# Patient Record
Sex: MALE | Race: WHITE | NOT HISPANIC OR LATINO | Employment: OTHER | URBAN - NONMETROPOLITAN AREA
[De-identification: names, ages, dates, MRNs, and addresses within clinical notes are randomized per-mention and may not be internally consistent; named-entity substitution may affect disease eponyms.]

---

## 2018-05-14 ENCOUNTER — HISTORICAL (OUTPATIENT)
Dept: ADMINISTRATIVE | Facility: HOSPITAL | Age: 63
End: 2018-05-14

## 2018-05-28 ENCOUNTER — HISTORICAL (OUTPATIENT)
Dept: RADIOLOGY | Facility: HOSPITAL | Age: 63
End: 2018-05-28

## 2018-05-30 ENCOUNTER — HISTORICAL (OUTPATIENT)
Dept: RADIOLOGY | Facility: HOSPITAL | Age: 63
End: 2018-05-30

## 2019-07-30 LAB
ABS NEUT (OLG): 3.1 X10(3)/MCL (ref 1.5–6.9)
APPEARANCE, UA: CLEAR
APTT PPP: 28.6 SECOND(S) (ref 25–35)
BILIRUB UR QL STRIP: NEGATIVE
BUN SERPL-MCNC: 19 MG/DL (ref 10–20)
CALCIUM SERPL-MCNC: 9.1 MG/DL (ref 8–10.5)
CHLORIDE SERPL-SCNC: 104 MMOL/L (ref 100–108)
CO2 SERPL-SCNC: 32 MMOL/L (ref 21–35)
COLOR UR: NORMAL
CREAT SERPL-MCNC: 1.13 MG/DL (ref 0.7–1.3)
CREAT/UREA NIT SERPL: 17
CRP SERPL-MCNC: 0.4 MG/DL (ref 0–0.9)
ERYTHROCYTE [DISTWIDTH] IN BLOOD BY AUTOMATED COUNT: 13.3 % (ref 11.5–17)
ERYTHROCYTE [SEDIMENTATION RATE] IN BLOOD: 15 MM/HR (ref 0–15)
GLUCOSE (UA): NEGATIVE
GLUCOSE SERPL-MCNC: 114 MG/DL (ref 75–116)
HCT VFR BLD AUTO: 44.7 % (ref 42–52)
HGB BLD-MCNC: 14.6 GM/DL (ref 14–18)
HGB UR QL STRIP: NEGATIVE
INR PPP: 1 (ref 0–1.2)
KETONES UR QL STRIP: NEGATIVE
LEUKOCYTE ESTERASE UR QL STRIP: NEGATIVE
MCH RBC QN AUTO: 31 PG (ref 27–34)
MCHC RBC AUTO-ENTMCNC: 33 GM/DL (ref 31–36)
MCV RBC AUTO: 96 FL (ref 80–99)
NITRITE UR QL STRIP: NEGATIVE
PH UR STRIP: 6.5 [PH]
PLATELET # BLD AUTO: 233 X10(3)/MCL (ref 140–400)
PMV BLD AUTO: 10.3 FL (ref 6.8–10)
POTASSIUM SERPL-SCNC: 4.1 MMOL/L (ref 3.6–5.2)
PROT UR QL STRIP: NEGATIVE
PROTHROMBIN TIME: 10.2 SECOND(S) (ref 9–12)
RBC # BLD AUTO: 4.66 X10(6)/MCL (ref 4.7–6.1)
SODIUM SERPL-SCNC: 140 MMOL/L (ref 135–145)
SP GR UR STRIP: 1.02
UROBILINOGEN UR STRIP-ACNC: 0.2 EU/DL
WBC # SPEC AUTO: 5.6 X10(3)/MCL (ref 4.5–11.5)

## 2019-08-01 LAB — FINAL CULTURE: NORMAL

## 2019-08-14 ENCOUNTER — HISTORICAL (OUTPATIENT)
Dept: ANESTHESIOLOGY | Facility: HOSPITAL | Age: 64
End: 2019-08-14

## 2020-06-29 LAB
ABS NEUT (OLG): 4.7 X10(3)/MCL (ref 1.5–6.9)
APPEARANCE, UA: CLEAR
APTT PPP: 31.9 SECOND(S) (ref 25–35)
BACTERIA SPEC CULT: NORMAL /HPF
BILIRUB UR QL STRIP: NEGATIVE
BUN SERPL-MCNC: 17 MG/DL (ref 8.4–25.7)
CALCIUM SERPL-MCNC: 9.8 MG/DL (ref 8.8–10)
CHLORIDE SERPL-SCNC: 101 MMOL/L (ref 98–107)
CO2 SERPL-SCNC: 30 MMOL/L (ref 23–31)
COLOR UR: YELLOW
CREAT SERPL-MCNC: 1.01 MG/DL (ref 0.73–1.18)
CREAT/UREA NIT SERPL: 17
CRP SERPL-MCNC: 0.4 MG/DL
ERYTHROCYTE [DISTWIDTH] IN BLOOD BY AUTOMATED COUNT: 13.9 % (ref 11.5–17)
ERYTHROCYTE [SEDIMENTATION RATE] IN BLOOD: 22 MM/HR (ref 0–15)
GLUCOSE (UA): NEGATIVE MG/DL
GLUCOSE SERPL-MCNC: 98 MG/DL (ref 82–115)
GROUP & RH: NORMAL
HCT VFR BLD AUTO: 46.7 % (ref 42–52)
HGB BLD-MCNC: 15.2 GM/DL (ref 14–18)
HGB UR QL STRIP: NEGATIVE
INR PPP: 1 (ref 0–1.2)
KETONES UR QL STRIP: NEGATIVE MG/DL
LEUKOCYTE ESTERASE UR QL STRIP: NORMAL
MCH RBC QN AUTO: 31 PG (ref 27–34)
MCHC RBC AUTO-ENTMCNC: 32 GM/DL (ref 31–36)
MCV RBC AUTO: 95 FL (ref 80–99)
MRSA SCREEN BY PCR: NEGATIVE
NITRITE UR QL STRIP: NEGATIVE
PH UR STRIP: 7.5 [PH] (ref 4.6–8)
PLATELET # BLD AUTO: 255 X10(3)/MCL (ref 140–400)
PMV BLD AUTO: 10 FL (ref 6.8–10)
POTASSIUM SERPL-SCNC: 4.3 MMOL/L (ref 3.5–5.1)
PROT UR QL STRIP: NEGATIVE MG/DL
PROTHROMBIN TIME: 13.3 SECOND(S) (ref 9–12)
RBC # BLD AUTO: 4.92 X10(6)/MCL (ref 4.7–6.1)
RBC #/AREA URNS HPF: NORMAL /HPF
SODIUM SERPL-SCNC: 138 MMOL/L (ref 136–145)
SP GR UR STRIP: 1.02 (ref 1–1.03)
SQUAMOUS EPITHELIAL, UA: NORMAL /LPF
UROBILINOGEN UR STRIP-ACNC: 0.2 EU/DL
WBC # SPEC AUTO: 8.1 X10(3)/MCL (ref 4.5–11.5)
WBC #/AREA URNS HPF: NORMAL /HPF

## 2020-07-01 ENCOUNTER — HISTORICAL (OUTPATIENT)
Dept: MEDSURG UNIT | Facility: HOSPITAL | Age: 65
End: 2020-07-01

## 2020-07-02 LAB
HCT VFR BLD AUTO: 40.1 % (ref 42–52)
HGB BLD-MCNC: 13.5 GM/DL (ref 14–18)

## 2022-04-30 NOTE — OP NOTE
PREOP DIAGNOSIS:  Osteoarthritis, left knee.    POSTOP DIAGNOSIS:  Osteoarthritis, left knee.    PROCEDURE:  Left total knee arthroplasty.    IMPLANT TYPE:  Attune porous-coated knee, size 9 femur, size 10 tibia, size 5 polyethylene insert, non-resurfaced patella.    INDICATION:  The patient is a 64-year-old active male with degenerative joint disease.  Informed consent was obtained.  The risks of the procedure were explained, not excluding infection, bleeding, pain, scarring, neurovascular injury, DVT, pulmonary embolism, and even death.    PROCEDURE IN DETAIL:  Given IV antibiotics and spinal anesthesia.  Tourniquet was inflated.  Anterior incision was made.  Medial arthrotomy was performed.  Patella was everted.  It was preserved, not resurfaced.  Generous medial release was done.  Osteophytes removed from the periphery of the femur and the tibia.  Using IM instrumentation, a distal femoral cut was made.  Using IM instrumentation, a proximal tibial cut was made.  Measured the extension gap for 5 bilaterally.  Next, the femur was sized for a 9 using posterior reference system.  Flexion gap was also measured for rotational purposes.  Finishing guide was secured.  Anterior, posterior, and chamfer cuts followed by the chip shot.  Tibia __________ finishing prep was made full coverage with a size 10 conical reamer followed by drilling of 4 small pegs.  Bone quality was excellent.  Back of the knee was cleaned of meniscal remnants.  The patella was not resurfaced.  Impacted porous-coated components and a rotating platform polyethylene.  The knee was taken through a full range of motion, full extension, full flexion.  I did a little recess at the PCL, which allowed better tracking.  Wound was Pulsavac lavaged with a light Betadine irrigant, closed over a drain.  Interrupted #1 Vicryl subcu 2-0 with a subcuticular stitch.  No complications.      MRH/MODL   DD: 07/01/2020 1911   DT: 07/01/2020 2002  Job #  [id number]

## 2022-05-02 NOTE — HISTORICAL [facility] CERNER
This is a historical note converted from [place]. Formatting and pictures may have been removed.  Please reference [place] for original formatting and attached multimedia. Admit and Discharge Dates  Admit Date: 07/01/2020  Discharge Date: 07/02/2020  Physicians  Attending Physician - Rufus GORDON, Pedro Luis CLARK  Admitting Physician - Rufus GORDON, Pedro Luis CLARK  Consulting Physician - Rufus GORDON, Pedro Luis CLARK  Primary Care Physician - Gloria GORDON, Hanna NEELY  Discharge Diagnosis  Hypertension?I10  Osteoarthritis of knee?M17  Surgical Procedures  07/01/2020 - [id number] - Total Knee Arthroplasty  Immunizations  No immunizations recorded for this visit.  Significant Findings  No perioperative complcations  Objective  Vitals & Measurements  T:?36.4? ?C (Oral)? TMIN:?36.4? ?C (Oral)? TMAX:?36.6? ?C (Oral)? HR:?64(Peripheral)? BP:?107/60? SpO2:?95%?  Physical Exam  General:?well-developed well-nourished in no acute distress  Eye: EOMI, clear conjunctiva, eyelids normal  HENT:?Normocephalic/atraumatic, oropharynx and nasal mucosal surfaces moist  Neck: full range of motion, no lymphadenopathy  Respiratory:?Non-labored breathing  Cardiovascular:?regular rate and rhythm, no edema  Gastrointestinal:?soft, non-tender, non-distended, without masses to palpation  Genitourinary: no CVA tenderness to palpation  Musculoskeletal:??? Left lower extremity:? Aqucel dressing is clean dry and intact.? There is no excessive swelling or erythema noted about the incision.? NV intact to the distal extremity.? Moderate discomfort with ROM of the knee, to be expected following surgery.  Integumentary: no rashes or skin lesions present  Neurologic: cranial nerves intact, no signs of peripheral neurological deficit, motor/sensory function intact  ?  Patient Discharge Condition  Stable, denies chest pain, calf pain, shortness of breath and dyspnea Hemoglobin 13.5, Hematocrit 40.1  Discharge Disposition  Patient is discharged home with [facility] to  include PT, WBAT with walker  He is given a prescription for Percocet 10/325 mg q 6 hrs prn, Toradol 10 mg q 8 hrs prn, Duricef 500 mg BID x 10 d  He will continue  mg BID and wear bilateral PORSHA hose for 6 weeks for DVT prophylaxis  Follow up with Dr. Hooper in 2 weeks, Aquacel dressing to remain in place until then.   Discharge Medication Reconciliation  Prescribed  aspirin (Ecotrin 325 mg oral enteric coated tablet)?325 mg, Oral, BID  Continue  fexofenadine (Allegra 24 Hour Allergy oral tablet)?180 mg, Oral, qAM  lisinopril (LISINOPRIL 20 MG TABLET)?20 mg, Oral, qAM  tamsulosin (TAMSULOSIN 0.4MG CAPSULES)?0.8 mg, Oral, qAM  Education and Orders Provided  Total Knee Replacement, Easy-to-Read  Discharge - 07/02/20 16:30:00 CDT, Dis/Trn Home Health, [place] home with HH, PT WBAT with walker, percocet 10/325 q 6hr prn, toradol 10mg ?q 8 hrs prn, duricef 500mg BID x 10d, asa 325mg BID and marino porsha hose for 6 weeks for dvt prophylaxis follow up with dr. rocha...?  Follow up  Pedro Luis Hooper, within 2 weeks, on

## 2022-10-26 ENCOUNTER — HOSPITAL ENCOUNTER (EMERGENCY)
Facility: HOSPITAL | Age: 67
Discharge: HOME | End: 2022-10-26
Attending: INTERNAL MEDICINE
Payer: MEDICARE

## 2022-10-26 VITALS
WEIGHT: 300 LBS | SYSTOLIC BLOOD PRESSURE: 179 MMHG | DIASTOLIC BLOOD PRESSURE: 92 MMHG | HEIGHT: 69 IN | RESPIRATION RATE: 16 BRPM | TEMPERATURE: 98 F | BODY MASS INDEX: 44.43 KG/M2 | OXYGEN SATURATION: 95 % | HEART RATE: 65 BPM

## 2022-10-26 DIAGNOSIS — H43.392 FLOATERS IN VISUAL FIELD, LEFT: Primary | ICD-10-CM

## 2022-10-26 DIAGNOSIS — I10 HYPERTENSION, UNSPECIFIED TYPE: ICD-10-CM

## 2022-10-26 PROCEDURE — 99281 EMR DPT VST MAYX REQ PHY/QHP: CPT

## 2022-10-26 NOTE — ED PROVIDER NOTES
10/26/2022         11:11 AM    Source of History:  History obtained from the patient.       Chief complaint:  From Nurse Triage:  Eye Problem (L eye floaters starting yesterday   today feels like there is a spider web over his L eye and it is making him dizzy)    HPI:  Danny P Lejeune is a 67 y.o. male presenting with Eye Problem (L eye floaters starting yesterday   today feels like there is a spider web over his L eye and it is making him dizzy)       Patient comes to the emergency room with complaint of the floater in the left eye since yesterday, and it got worse this morning where he started having some blurred vision on the left eye and spider web in front of the eye and felt a little dizzy so he decided to come to the emergency room.  Denies any other complaints whatsoever.    Review of Systems   Constitutional symptoms:  No Fever. No Chills    Skin symptoms:  No Rash.    Eye symptoms:  Visual disturbance reported in left eye with the floater and the blurred vision and a web in front of the eye..    ENMT symptoms:  No Sore throat,    Respiratory symptoms:  No Shortness of Breath, no Cough, no Wheezing.    Cardiovascular symptoms:  No Chest Pain, No Palpitations, No Tachycardia.    Gastrointestinal symptoms:  No Abdominal Pain, No Nausea, No Vomiting, No Diarrhea, No Constipation.    Genitourinary symptoms:  No Dysuria,    Musculoskeletal symptoms:  No Back pain,    Neurologic symptoms:  No Headache, No Dizziness.    Psychiatric symptoms:  No Anxiety, No Depression, No Substance Abuse.              Additional review of systems information: Patient Denies Any Other Complaints.  All Other Systems Reviewed With Patient And Negative.    ALLEGIES:  Review of patient's allergies indicates:   Allergen Reactions    Hydromorphone Hives    Morphine Hives       HOME MEDICINES:  No current facility-administered medications for this encounter.  No current outpatient medications on file.    PMH:  As per HPI and  below:    Reviewed and updated in chart.    PAST MEDICAL HISTORY:  Past Medical History:   Diagnosis Date    Hypertension         PAST SURGICAL HISTORY:  Past Surgical History:   Procedure Laterality Date    BACK SURGERY      CERVICAL SPINE SURGERY      Prostate lift         SOCIAL HISTORY:       FAMILY HISTORY:  Family History   Problem Relation Age of Onset    Hypertension Mother     Heart disease Mother     Stroke Father     Hypertension Father         PROBLEM LIST:  There is no problem list on file for this patient.       PHYSICAL EXAM:      ED Triage Vitals [10/26/22 1045]   BP (!) 179/92   Pulse 65   Resp 16   Temp 97.7 °F (36.5 °C)   SpO2 95 %        Vital Signs: Reviewed As In Chart.  General:  Alert, No Cardiorespiratory Distress Noted.   Skin: Normal For Ethnic Origin  Eye:  Extraocular Movements Are Intact.  Lids normal bilaterally  Cornea clear bilaterally, conjunctiva clear bilaterally, sclera clear bilaterally, people are around regular and reacting to light and accommodation, anterior chamber is clear bilaterally, Lense is clear bilaterally.  I was not able to see the whole retina but part of the retina which I saw appears to be normal except for some silver streaking of the blood vessels.  ENT: Mucus membranes are moist.   Cardiovascular:  Regular Rate And Rhythm, No Murmur, No Pedal Edema.    Respiratory:  Respirations Nonlabored, No Respiratory Distress, Good Bilateral Air Entry, No Rales, No Rhonchi.    Musculoskeletal:  No Gross Deformity Noted.   Gastrointestinal:  Soft, Non Distended, Non Tender, Normal Bowel Sounds.    Neurological:  Alert And Oriented To Person, Place, Time, And Situation, Normal Motor Observed, Normal Speech Observed.  NIH 0, VAN Neg.  Psychiatric:  Cooperative, Appropriate Mood & Affect.    INITIAL IMPRESSION/ DIFFERENTIAL DX:      MEDICAL DECISION MAKING:      Reviewed Nurses Note. Reviewed Vital Signs.     Reviewed Pertinent old records, History and updated as  necessary.    67 y.o. male with Eye Problem (L eye floaters starting yesterday   today feels like there is a spider web over his L eye and it is making him dizzy)    Patient is not having a major blood vessel stroke at this time, but he does have the visual disturbance, I could see part of the retina, and I advised him that he has to be seen by Ophthalmology today.    ED WORKUP AND COURSE:  ED ORDERS:  No orders of the defined types were placed in this encounter.      Medications - No data to display             ED LABS ORDERED AND REVIEWED:      RADIOLOGY STUDIES ORDERED AND REVIEWED:  Imaging Results    None         ED COURSE AND REEVALUATIONS:  Vitals:    10/26/22 1045   BP: (!) 179/92   Pulse: 65   Resp: 16   Temp: 97.7 °F (36.5 °C)       PROCEDURES PERFORMED IN ED:  Procedures    ED Course as of 10/26/22 1115   Wed Oct 26, 2022   1108 Advised to pt. That he has to be seen by Ophthalmology today and we do not have coverage, I can try to transfer them or if they have Ophthalmologist they can see them. Pts. Wife called Dr. Lugo's office and they advised them to go to Dr. Gaines's office in [place] now and they can take care of every thing and they decided to go there. Will discharge and let them see Ophthalmology first and then if needed advised that they can always come back to our ER and one of the ER in [place] where they have neurology and ophthalmology both available. [GQ]      ED Course User Index  [GQ] Colin Camacho MD              DIAGNOSTIC IMPRESSION:      1. Floaters in visual field, left    2. Hypertension, unspecified type         ED Disposition Condition    Discharge Stable               Medication List      You have not been prescribed any medications.           Follow-up Information       Eye Doctor In 1 day.                              ED Prescriptions    None       Follow-up Information       Follow up With Specialties Details Why Contact Info    Eye Doctor  In 1 day                  Colin Camacho MD  10/26/22 3960

## 2022-10-26 NOTE — DISCHARGE INSTRUCTIONS
Take medicines as prescribed    See your family doctor in one to 2 days for further evaluation, workup, and treatment as necessary    Avoid driving or operating machinery while taking medicines as some medicines might cause drowsiness and may cause problems. Also pain medicines have potential of being addictive  so use Pain meds specially Narcotics Sparingly.    The exam and treatment you received in Emergency Room was for an urgent problem and NOT INTENDED AS COMPLETE CARE. It is important that you FOLLOW UP with a doctor for ongoing care. If your symptoms become WORSE or you DO NOT IMPROVE and you are unable to reach your health care provider, you should RETURN to the emergency department. The Emergency Room doctor has provided a PRELIMINARY INTERPRETATION of all your STUDIES. A final interpretation may be done after you are discharged. IF A CHANGE in your diagnosis or treatment is needed WE WILL CONTACT YOU. It is critical that we have a CURRENT PHONE NUMBER FOR YOU.          Must see an eye doctor as soon as possible for reevaluation of the eyes in detail    Use medicine as prescribed until seen by the eye doctor